# Patient Record
Sex: FEMALE | Race: WHITE | Employment: UNEMPLOYED | URBAN - METROPOLITAN AREA
[De-identification: names, ages, dates, MRNs, and addresses within clinical notes are randomized per-mention and may not be internally consistent; named-entity substitution may affect disease eponyms.]

---

## 2022-03-02 PROBLEM — Z34.90 TERM PREGNANCY: Status: ACTIVE | Noted: 2022-03-02

## 2022-08-27 ENCOUNTER — HOSPITAL ENCOUNTER (EMERGENCY)
Age: 27
Discharge: HOME | End: 2022-08-27
Attending: EMERGENCY MEDICINE
Payer: COMMERCIAL

## 2022-08-27 VITALS
RESPIRATION RATE: 18 BRPM | HEART RATE: 99 BPM | HEIGHT: 62 IN | TEMPERATURE: 98.7 F | OXYGEN SATURATION: 100 % | SYSTOLIC BLOOD PRESSURE: 127 MMHG | DIASTOLIC BLOOD PRESSURE: 76 MMHG | WEIGHT: 140 LBS | BODY MASS INDEX: 25.76 KG/M2

## 2022-08-27 DIAGNOSIS — S01.01XA LACERATION OF SCALP, INITIAL ENCOUNTER: Primary | ICD-10-CM

## 2022-08-27 PROCEDURE — 74011250636 HC RX REV CODE- 250/636: Performed by: EMERGENCY MEDICINE

## 2022-08-27 PROCEDURE — 90471 IMMUNIZATION ADMIN: CPT

## 2022-08-27 PROCEDURE — 90715 TDAP VACCINE 7 YRS/> IM: CPT | Performed by: EMERGENCY MEDICINE

## 2022-08-27 PROCEDURE — 99284 EMERGENCY DEPT VISIT MOD MDM: CPT

## 2022-08-27 RX ADMIN — TETANUS TOXOID, REDUCED DIPHTHERIA TOXOID AND ACELLULAR PERTUSSIS VACCINE, ADSORBED 0.5 ML: 5; 2.5; 8; 8; 2.5 SUSPENSION INTRAMUSCULAR at 14:26

## 2022-08-27 NOTE — ED TRIAGE NOTES
Per EMS patient was taking a box out of the attic and it hit her on the top of her head in the hairline. Patient did not have any LOC at the time of accident. Patient is alert and oriented x 4 to person place time and situation.

## 2022-08-27 NOTE — ED PROVIDER NOTES
EMERGENCY DEPARTMENT HISTORY AND PHYSICAL EXAM    2:41 PM patient seen at this time in room 8      Date: 8/27/2022  Patient Name: Marylin Alexander    History of Presenting Illness     Chief Complaint   Patient presents with    Head Injury    Laceration         History Provided By: patient    Additional History (Context): Marylin Alexander is a 24 y.o. female presents with patient was helping move a file cabinet down from the attic she was on the bottom and mishandled it, struck her right at the vertex on her scalp, small laceration. No contributory medical history no concussive symptoms or loss of consciousness. PCP: None    Chief Complaint:   Duration:    Timing:    Location:   Quality:   Severity:   Modifying Factors:   Associated Symptoms:       Current Outpatient Medications   Medication Sig Dispense Refill    ibuprofen (MOTRIN) 600 mg tablet Take 1 Tablet by mouth every six (6) hours as needed for Pain. (Patient not taking: Reported on 8/27/2022) 40 Tablet 1    PNV Comb #2-Iron-FA-Omega 3 29-1-400 mg cmpk Take  by mouth. (Patient not taking: Reported on 8/27/2022)         Past History     Past Medical History:  Past Medical History:   Diagnosis Date    Obesity affecting pregnancy in third trimester        Past Surgical History:  Past Surgical History:   Procedure Laterality Date    HX COLONOSCOPY         Family History:  Family History   Problem Relation Age of Onset    Mental Retardation Other        Social History:  Social History     Tobacco Use    Smoking status: Never    Smokeless tobacco: Never   Vaping Use    Vaping Use: Never used   Substance Use Topics    Alcohol use: Not Currently     Comment: socially    Drug use: Not Currently       Allergies:  No Known Allergies      Review of Systems     Review of Systems   Constitutional:  Negative for diaphoresis and fever. HENT:  Negative for congestion and sore throat. Eyes:  Negative for pain and itching.    Respiratory:  Negative for cough and shortness of breath. Cardiovascular:  Negative for chest pain and palpitations. Gastrointestinal:  Negative for abdominal pain and diarrhea. Endocrine: Negative for polydipsia and polyuria. Genitourinary:  Negative for dysuria and hematuria. Musculoskeletal:  Negative for arthralgias and myalgias. Skin:  Positive for wound. Negative for rash. Neurological:  Negative for seizures and syncope. Hematological:  Does not bruise/bleed easily. Psychiatric/Behavioral:  Negative for agitation and hallucinations. Physical Exam       Patient Vitals for the past 12 hrs:   Temp Pulse Resp BP SpO2   08/27/22 1422 -- -- -- -- 100 %   08/27/22 1417 98.7 °F (37.1 °C) 99 18 127/76 100 %       IPVITALS  Patient Vitals for the past 24 hrs:   BP Temp Pulse Resp SpO2 Height Weight   08/27/22 1422 -- -- -- -- 100 % -- --   08/27/22 1417 127/76 98.7 °F (37.1 °C) 99 18 100 % 5' 2\" (1.575 m) 63.5 kg (140 lb)       Physical Exam  Vitals and nursing note reviewed. Constitutional:       Appearance: She is well-developed. HENT:      Head: Normocephalic. Comments: Linear centimeter and a half laceration, gapes about a millimeter and a millimeter deep. No significant bleeding. Eyes:      General: No scleral icterus. Extraocular Movements: Extraocular movements intact. Conjunctiva/sclera: Conjunctivae normal.   Neck:      Vascular: No JVD. Cardiovascular:      Rate and Rhythm: Normal rate and regular rhythm. Pulmonary:      Effort: Pulmonary effort is normal. No respiratory distress. Musculoskeletal:         General: Normal range of motion. Cervical back: Normal range of motion and neck supple. Skin:     General: Skin is warm and dry. Neurological:      General: No focal deficit present. Mental Status: She is alert. Psychiatric:         Thought Content:  Thought content normal.         Judgment: Judgment normal.         Diagnostic Study Results   Labs -  No results found for this or any previous visit (from the past 24 hour(s)). Radiologic Studies -   No orders to display     No results found. Medications ordered:   Medications   diph,Pertuss(AC),Tet Vac-PF (BOOSTRIX) suspension 0.5 mL (0.5 mL IntraMUSCular Given 8/27/22 1426)         Medical Decision Making   Initial Medical Decision Making and DDx: The wound really does not separate, minimally , do not think will benefit from sutures, as well within the hairline. Informed discussion with the patient she agrees. No indication for head CT. Discussed concussive symptoms    ED Course: Progress Notes, Reevaluation, and Consults:         I am the first provider for this patient. I reviewed the vital signs, available nursing notes, past medical history, past surgical history, family history and social history. Patient Vitals for the past 12 hrs:   Temp Pulse Resp BP SpO2   08/27/22 1422 -- -- -- -- 100 %   08/27/22 1417 98.7 °F (37.1 °C) 99 18 127/76 100 %       Vital Signs-Reviewed the patient's vital signs. Pulse Oximetry Analysis, Cardiac Monitor, 12 lead ekg:      Interpreted by the EP. Records Reviewed: Nursing notes reviewed (Time of Review: 2:41 PM)    Procedures:   Critical Care Time:   Aspirin: (was aspirin given for stroke?)    Diagnosis     Clinical Impression:   1. Laceration of scalp, initial encounter        Disposition: Discharged      Follow-up Information       Follow up With Specialties Details Why Contact Tristan Chamberlain MD Internal Medicine Physician In 1 week  [address]  [phone]               Patient's Medications   Start Taking    No medications on file   Continue Taking    IBUPROFEN (MOTRIN) 600 MG TABLET    Take 1 Tablet by mouth every six (6) hours as needed for Pain. PNV COMB #2-IRON-FA-OMEGA 3 29-1-400 MG CMPK    Take  by mouth.    These Medications have changed    No medications on file   Stop Taking    No medications on file _______________________________    Notes:    Mechele Judah, MD using Dragon dictation      _______________________________

## 2022-08-27 NOTE — ED NOTES
Discharge teaching provided to pt regarding treatment received, medications prescribed, and follow-up care. Pt verbalized understanding directions and follow up care. Pt left ambulatory with discharge paperwork in hand.

## 2022-08-27 NOTE — ED NOTES
Pt alert and oriented times 4, denies blurred vision, and vomiting at this time. Pt stated \" I feel a little nauseous but, I haven't had lunch yet. \" Pt medicated as per MAR, placed on BP cuff and pulse ox at this time. Bed low and locked, call bell within reach.